# Patient Record
Sex: FEMALE | Race: WHITE | URBAN - METROPOLITAN AREA
[De-identification: names, ages, dates, MRNs, and addresses within clinical notes are randomized per-mention and may not be internally consistent; named-entity substitution may affect disease eponyms.]

---

## 2018-09-12 ENCOUNTER — EMERGENCY (EMERGENCY)
Facility: HOSPITAL | Age: 58
LOS: 1 days | Discharge: HOME | End: 2018-09-12
Attending: EMERGENCY MEDICINE | Admitting: EMERGENCY MEDICINE
Payer: COMMERCIAL

## 2018-09-12 VITALS
DIASTOLIC BLOOD PRESSURE: 65 MMHG | SYSTOLIC BLOOD PRESSURE: 102 MMHG | RESPIRATION RATE: 16 BRPM | HEART RATE: 73 BPM | TEMPERATURE: 98 F | OXYGEN SATURATION: 98 %

## 2018-09-12 VITALS
SYSTOLIC BLOOD PRESSURE: 95 MMHG | DIASTOLIC BLOOD PRESSURE: 59 MMHG | WEIGHT: 119.93 LBS | HEART RATE: 82 BPM | TEMPERATURE: 99 F | RESPIRATION RATE: 16 BRPM | OXYGEN SATURATION: 96 %
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
## 2018-09-12 DIAGNOSIS — S00.03XA CONTUSION OF SCALP, INITIAL ENCOUNTER: ICD-10-CM

## 2018-09-12 DIAGNOSIS — Z79.899 OTHER LONG TERM (CURRENT) DRUG THERAPY: ICD-10-CM
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
## 2018-09-12 DIAGNOSIS — F17.200 NICOTINE DEPENDENCE, UNSPECIFIED, UNCOMPLICATED: ICD-10-CM
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
## 2018-09-12 DIAGNOSIS — S06.9X9A UNSPECIFIED INTRACRANIAL INJURY WITH LOSS OF CONSCIOUSNESS OF UNSPECIFIED DURATION, INITIAL ENCOUNTER: ICD-10-CM

## 2018-09-12 DIAGNOSIS — R55 SYNCOPE AND COLLAPSE: ICD-10-CM

## 2018-09-12 DIAGNOSIS — Z90.49 ACQUIRED ABSENCE OF OTHER SPECIFIED PARTS OF DIGESTIVE TRACT: Chronic | ICD-10-CM
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
## 2018-09-12 DIAGNOSIS — E87.6 HYPOKALEMIA: ICD-10-CM

## 2018-09-12 LAB
ALBUMIN SERPL ELPH-MCNC: 4.1 G/DL — SIGNIFICANT CHANGE UP (ref 3.3–5)
ALP SERPL-CCNC: 44 U/L — SIGNIFICANT CHANGE UP (ref 40–120)
ALT FLD-CCNC: 13 U/L — SIGNIFICANT CHANGE UP (ref 10–45)
ANION GAP SERPL CALC-SCNC: 14 MMOL/L — SIGNIFICANT CHANGE UP (ref 5–17)
AST SERPL-CCNC: 22 U/L — SIGNIFICANT CHANGE UP (ref 10–40)
BASOPHILS NFR BLD AUTO: 0.2 % — SIGNIFICANT CHANGE UP (ref 0–2)
BILIRUB SERPL-MCNC: 0.4 MG/DL — SIGNIFICANT CHANGE UP (ref 0.2–1.2)
BUN SERPL-MCNC: 9 MG/DL — SIGNIFICANT CHANGE UP (ref 7–23)
CALCIUM SERPL-MCNC: 9.4 MG/DL — SIGNIFICANT CHANGE UP (ref 8.4–10.5)
CHLORIDE SERPL-SCNC: 92 MMOL/L — LOW (ref 96–108)
CK MB CFR SERPL CALC: 1.5 NG/ML — SIGNIFICANT CHANGE UP (ref 0–6.7)
CK SERPL-CCNC: 79 U/L — SIGNIFICANT CHANGE UP (ref 25–170)
CO2 SERPL-SCNC: 29 MMOL/L — SIGNIFICANT CHANGE UP (ref 22–31)
CREAT SERPL-MCNC: 0.63 MG/DL — SIGNIFICANT CHANGE UP (ref 0.5–1.3)
EOSINOPHIL NFR BLD AUTO: 1.2 % — SIGNIFICANT CHANGE UP (ref 0–6)
GLUCOSE SERPL-MCNC: 115 MG/DL — HIGH (ref 70–99)
HCT VFR BLD CALC: 38.5 % — SIGNIFICANT CHANGE UP (ref 34.5–45)
HGB BLD-MCNC: 13.3 G/DL — SIGNIFICANT CHANGE UP (ref 11.5–15.5)
LYMPHOCYTES # BLD AUTO: 14.1 % — SIGNIFICANT CHANGE UP (ref 13–44)
MCHC RBC-ENTMCNC: 29.8 PG — SIGNIFICANT CHANGE UP (ref 27–34)
MCHC RBC-ENTMCNC: 34.5 G/DL — SIGNIFICANT CHANGE UP (ref 32–36)
MCV RBC AUTO: 86.3 FL — SIGNIFICANT CHANGE UP (ref 80–100)
MONOCYTES NFR BLD AUTO: 7.1 % — SIGNIFICANT CHANGE UP (ref 2–14)
NEUTROPHILS NFR BLD AUTO: 77.4 % — HIGH (ref 43–77)
PLATELET # BLD AUTO: 259 K/UL — SIGNIFICANT CHANGE UP (ref 150–400)
POTASSIUM SERPL-MCNC: 2.6 MMOL/L — CRITICAL LOW (ref 3.5–5.3)
POTASSIUM SERPL-SCNC: 2.6 MMOL/L — CRITICAL LOW (ref 3.5–5.3)
PROT SERPL-MCNC: 6.7 G/DL — SIGNIFICANT CHANGE UP (ref 6–8.3)
RBC # BLD: 4.46 M/UL — SIGNIFICANT CHANGE UP (ref 3.8–5.2)
RBC # FLD: 13 % — SIGNIFICANT CHANGE UP (ref 10.3–16.9)
SODIUM SERPL-SCNC: 135 MMOL/L — SIGNIFICANT CHANGE UP (ref 135–145)
TROPONIN T SERPL-MCNC: <0.01 NG/ML — SIGNIFICANT CHANGE UP (ref 0–0.01)
WBC # BLD: 9.7 K/UL — SIGNIFICANT CHANGE UP (ref 3.8–10.5)
WBC # FLD AUTO: 9.7 K/UL — SIGNIFICANT CHANGE UP (ref 3.8–10.5)

## 2018-09-12 PROCEDURE — 70450 CT HEAD/BRAIN W/O DYE: CPT

## 2018-09-12 PROCEDURE — 93010 ELECTROCARDIOGRAM REPORT: CPT | Mod: 59

## 2018-09-12 PROCEDURE — 93005 ELECTROCARDIOGRAM TRACING: CPT

## 2018-09-12 PROCEDURE — 82550 ASSAY OF CK (CPK): CPT

## 2018-09-12 PROCEDURE — 70450 CT HEAD/BRAIN W/O DYE: CPT | Mod: 26

## 2018-09-12 PROCEDURE — 84484 ASSAY OF TROPONIN QUANT: CPT

## 2018-09-12 PROCEDURE — 80053 COMPREHEN METABOLIC PANEL: CPT

## 2018-09-12 PROCEDURE — 82553 CREATINE MB FRACTION: CPT

## 2018-09-12 PROCEDURE — 99284 EMERGENCY DEPT VISIT MOD MDM: CPT | Mod: 25

## 2018-09-12 PROCEDURE — 85025 COMPLETE CBC W/AUTO DIFF WBC: CPT

## 2018-09-12 PROCEDURE — 90792 PSYCH DIAG EVAL W/MED SRVCS: CPT

## 2018-09-12 RX ORDER — ACETAMINOPHEN 500 MG
650 TABLET ORAL ONCE
Qty: 0 | Refills: 0 | Status: COMPLETED | OUTPATIENT
Start: 2018-09-12 | End: 2018-09-12

## 2018-09-12 RX ORDER — POTASSIUM CHLORIDE 20 MEQ
10 PACKET (EA) ORAL ONCE
Qty: 0 | Refills: 0 | Status: COMPLETED | OUTPATIENT
Start: 2018-09-12 | End: 2018-09-12

## 2018-09-12 RX ORDER — CLONAZEPAM 1 MG
0 TABLET ORAL
Qty: 0 | Refills: 0 | COMMUNITY

## 2018-09-12 RX ORDER — SODIUM CHLORIDE 9 MG/ML
1000 INJECTION INTRAMUSCULAR; INTRAVENOUS; SUBCUTANEOUS ONCE
Qty: 0 | Refills: 0 | Status: COMPLETED | OUTPATIENT
Start: 2018-09-12 | End: 2018-09-12

## 2018-09-12 RX ORDER — POTASSIUM CHLORIDE 20 MEQ
10 PACKET (EA) ORAL ONCE
Qty: 0 | Refills: 0 | Status: DISCONTINUED | OUTPATIENT
Start: 2018-09-12 | End: 2018-09-12

## 2018-09-12 RX ORDER — POTASSIUM CHLORIDE 20 MEQ
40 PACKET (EA) ORAL ONCE
Qty: 0 | Refills: 0 | Status: COMPLETED | OUTPATIENT
Start: 2018-09-12 | End: 2018-09-12

## 2018-09-12 RX ADMIN — SODIUM CHLORIDE 2000 MILLILITER(S): 9 INJECTION INTRAMUSCULAR; INTRAVENOUS; SUBCUTANEOUS at 09:44

## 2018-09-12 RX ADMIN — Medication 40 MILLIEQUIVALENT(S): at 10:29

## 2018-09-12 RX ADMIN — Medication 100 MILLIEQUIVALENT(S): at 10:29

## 2018-09-12 RX ADMIN — SODIUM CHLORIDE 2000 MILLILITER(S): 9 INJECTION INTRAMUSCULAR; INTRAVENOUS; SUBCUTANEOUS at 09:46

## 2018-09-12 RX ADMIN — Medication 650 MILLIGRAM(S): at 09:43

## 2018-09-12 RX ADMIN — Medication 100 MILLIEQUIVALENT(S): at 11:49

## 2018-09-12 NOTE — ED PROVIDER NOTE - OBJECTIVE STATEMENT
57 yo female with hx of anxiety (takes Klonopin prn) p/w 2 episodes of syncope this morning. Pt states she was very stressed yesterday and could not sleep and took Z-quil 57 yo female with hx of anxiety (takes Klonopin prn), insomnia (takes prescribed Thorazine - 75 mg prn insomnia) p/w 2 episodes of syncope this morning. Pt states she was very stressed yesterday and could not sleep and took ZZZ-quil around 11 pm but was still unable to sleep and then took prescribed Thorazine (50 mg only) around 2 am. Pt then had to wake up this morning "for a meeting" and felt dizzy and lightheaded. According to daughter she heard a noise and found pt with decreased LOC in the hallway. Pt asked her for a banana and got up but then had another syncopal episode and fell to the floor. No seizure activity/ tongue-biting or incontinence. Pt cannot give a clear history about what transpired but states she has some pain to right side of her head from her falls. Denies CP, SOB, abd pain, fevers, chills, N,V,D, recent illness, focal neuro sxs. States she does not usually take Thorazine for sleep and she has had 2-3 episodes of syncope in the past associated with Thorazine use. Denies SI/HI/ hallucinations/ alcohol or illicit drug use. 57 yo female with hx of anxiety (takes Klonopin prn), insomnia (takes prescribed Thorazine - 75 mg prn insomnia) p/w 2 episodes of syncope this morning. Pt states she was very stressed yesterday and could not sleep and took ZZZ-quil around 11 pm but was still unable to sleep and then took prescribed Thorazine (50 mg only) around 2 am. Pt then had to wake up this morning "for a meeting" and felt dizzy and lightheaded. According to daughter she heard a noise and found pt with decreased LOC in the hallway. Pt asked her for a banana and got up but then had another syncopal episode and fell to the floor. No seizure activity/ tongue-biting or incontinence. Pt cannot give a clear history about what transpired but states she has some pain to right side of her head from her falls. Denies CP, SOB, abd pain, fevers, chills, N,V,D, recent illness, focal neuro sxs. States she does not usually take Thorazine for sleep and she has had 2-3 episodes of syncope in the past associated with Thorazine use. has had cardiac w/up including echo for her past episodes of syncope and reports that the w/up has been negative. Denies SI/HI/ hallucinations/ alcohol or illicit drug use. No neck pain or extremity pain.

## 2018-09-12 NOTE — ED BEHAVIORAL HEALTH ASSESSMENT NOTE - SUMMARY
57 yo female with hx of anxiiety (takes Klonopin prn), insomnia (takes prescribed Thorazine - 75 mg prn insomnia) p/w 2 episodes of syncope this morning. Pt states she was very stressed yesterday and could not sleep and took ZZZ-quil around 11 pm but was still unable to sleep and then took prescribed Thorazine (50 mg only) around 2 am. Pt then had to wake up this morning "for a meeting" and felt dizzy and lightheaded. A 57 yo female with hx of anxiety (takes Klonopin prn), insomnia (takes prescribed Thorazine - 75 mg prn insomnia), alcohol dependence, in full sustained remission, p/w 2 episodes of syncope this morning s/p taking 4 sedating medications last night, for insomnia exacerbated by anxiety.    Pt's presenting ss/sx likely rx-induced. In addition to sedating qualities of all 4 medications, thorazine a/w significant orthostatic hypotension.    Pt counselled regarding risks of taking mutiple hypnotic medications at the same time, including the syncopal episodes which led to ED visit today. She expressed understanding of these risks.    Daughters, understandably concerned regarding pt's 57 yo female with hx of anxiety (takes Klonopin prn), insomnia (takes prescribed Thorazine - 75 mg prn insomnia), alcohol dependence, in full sustained remission, p/w 2 episodes of syncope this morning s/p taking 4 sedating medications last night, for insomnia exacerbated by anxiety.    Pt's presenting ss/sx likely rx-induced. In addition to sedating qualities of all 4 medications, thorazine a/w significant orthostatic hypotension.    Pt counselled regarding risks of taking multiple hypnotic medications at the same time, including the syncopal episodes which led to ED visit today. She expressed understanding of these risks.    Daughters, understandably concerned regarding pt's possible abuse/overuse of prescription benzos and other sedating medications, provided with support, psychoeducation and recommendations regarding situation. They expressed understanding regarding possible role of family meeting with outpt psychiatrist and/or other interventions to assist pt in addressing her use of multiple sedating medications. They also expressed ability to investigate possible substance abuse programs for pt, should she agree to pursue this option.

## 2018-09-12 NOTE — ED PROVIDER NOTE - CARE PLAN
Principal Discharge DX:	Hypokalemia  Secondary Diagnosis:	Syncope  Secondary Diagnosis:	Closed head injury

## 2018-09-12 NOTE — ED ADULT TRIAGE NOTE - CHIEF COMPLAINT QUOTE
patient BIBA from home. patient took a sleeping pill last night that she does not normally take. this morning woke up feeling groggy and dizziness, fell x 2 hit head. no LOC no blood thinners, denies dizziness, blurry vision, chest pain. hematoma to left side of forehead, no bleeding or open wound

## 2018-09-12 NOTE — ED BEHAVIORAL HEALTH ASSESSMENT NOTE - DESCRIPTION (FIRST USE, LAST USE, QUANTITY, FREQUENCY, DURATION)
Smokes 2 cigarettes daily. Hx of EtOH Dependence. States she has been clean and sober since 2005. Reports she is rx'ed with Clonazepam 1 mg po prn and Alprazolam 1 mg po prn by one of her 2 outpt psychiatrists, Dr. Mario Pablo.

## 2018-09-12 NOTE — ED BEHAVIORAL HEALTH ASSESSMENT NOTE - SAFETY PLAN DETAILS
Discussed with daughters, including possible soft intervention and/or family meeting with outpt psychiatrist.

## 2018-09-12 NOTE — ED BEHAVIORAL HEALTH ASSESSMENT NOTE - DESCRIPTION
As per HPI Calm, cooperative. On stretcher. As above. Born and raised in [place], [place]. Graduate of [employer] in [place]. 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 .  2 daughters, ages 23 and 27. Lives with her younger daughter.

## 2018-09-12 NOTE — ED ADULT NURSE NOTE - OBJECTIVE STATEMENT
Patient is a 57yo female reporting 1 fall w/ head injury and 1 episode of syncope at 0800 this morning after taking 4 ZzzQuil at 11pm and 1 Thorazine 50mg at 2am for insomnia. Patient reports she rarely takes Thorazine, and has lost consciousness from it in the past. Denies any chest pain, dizziness, shortness of breath, abdominal pain, fevers. Neuro intact, no lacerations noted.

## 2018-09-12 NOTE — ED PROVIDER NOTE - PROGRESS NOTE DETAILS
Pt noted with low potassium. States she has a history of this in the past and does not take supplementation. Will replete potassium.

## 2018-09-12 NOTE — ED BEHAVIORAL HEALTH ASSESSMENT NOTE - NS ED BHA PLAN TR BH CONTACTED FT
Pt refused to let this writer contact Dr. Pablo or Dr. Espinal. However, she did give permission to discuss with MAGUI Lau.

## 2018-09-12 NOTE — ED ADULT NURSE NOTE - INTERVENTIONS DEFINITIONS
Instruct patient to call for assistance/Provide visual cue, wrist band, yellow gown, etc./Physically safe environment: no spills, clutter or unnecessary equipment/Non-slip footwear when patient is off stretcher/Stretcher in lowest position, wheels locked, appropriate side rails in place

## 2018-09-12 NOTE — ED PROVIDER NOTE - MEDICAL DECISION MAKING DETAILS
59 yo female with hx of anxiety (takes Klonopin prn), insomnia (takes prescribed Thorazine - 75 mg prn insomnia) p/w 2 episodes of syncope this morning. Pt states she was very stressed yesterday and could not sleep and took ZZZ-quil around 11 pm but was still unable to sleep and then took prescribed Thorazine (50 mg only) around 2 am. Pt then had to wake up this morning "for a meeting" and felt dizzy and lightheaded. According to daughter she heard a noise and found pt with decreased LOC in the hallway. Pt asked her for a banana and got up but then had another syncopal episode and fell to the floor. No seizure activity/ tongue-biting or incontinence. Pt cannot give a clear history about what transpired but states she has some pain to right side of her head from her falls. Denies CP, SOB, abd pain, fevers, chills, N,V,D, recent illness, focal neuro sxs. States she does not usually take Thorazine for sleep and she has had 2-3 episodes of syncope in the past associated with Thorazine use. has had cardiac w/up including echo for her past episodes of syncope and reports that the w/up has been negative. Denies SI/HI/ hallucinations/ alcohol or illicit drug use.

## 2018-09-12 NOTE — ED BEHAVIORAL HEALTH ASSESSMENT NOTE - REFERRAL / APPOINTMENT DETAILS
N/A. Discussed pt's possible misuse of prescription and non-prescription medications with pt and with her daughters. Although daughters are eager for pt to go into substance abuse rehab, this is neither something done directly from an ED, nor done without pt's cooperation and intention.

## 2018-09-12 NOTE — ED BEHAVIORAL HEALTH ASSESSMENT NOTE - SUICIDE PROTECTIVE FACTORS
Future oriented/Engaged in work or school/Identifies reasons for living/Responsibility to family and others/Supportive social network or family

## 2018-09-12 NOTE — ED BEHAVIORAL HEALTH ASSESSMENT NOTE - HPI (INCLUDE ILLNESS QUALITY, SEVERITY, DURATION, TIMING, CONTEXT, MODIFYING FACTORS, ASSOCIATED SIGNS AND SYMPTOMS)
57 yo female with hx of an"xiety (takes Klonopin prn), insomnia (takes prescribed Thorazine - 75 mg prn insomnia) p/w 2 episodes of syncope this morning. Pt states she was very stressed yesterday and could not sleep and took ZZZ-quil around 11 pm but was still unable to sleep and then took prescribed Thorazine (50 mg only) around 2 am. Pt then had to wake up this morning "for a meeting" and felt dizzy and lightheaded. According to daughter she heard a noise and found pt with decreased LOC in the hallway. Pt asked her for a banana and got up but then had another syncopal episode and fell to the floor. No seizure activity/ tongue-biting or incontinence. Pt cannot give a clear history about what transpired but states she has some pain to right side of her head from her falls. Denies CP, SOB, abd pain, fevers, chills, N,V,D, recent illness, focal neuro sxs. States she does not usually take Thorazine for sleep and she has had 2-3 episodes of syncope in the past associated with Thorazine use. has had cardiac w/up including echo for her past episodes of syncope and reports that the w/up has been negative. Denies SI/HI/ hallucinations/ alcohol or illicit drug use. No neck pain or extremity pain." From ED Provider Note: "59 yo female with hx of anxiiety (takes Klonopin prn), insomnia (takes prescribed Thorazine - 75 mg prn insomnia) p/w 2 episodes of syncope this morning. Pt states she was very stressed yesterday and could not sleep and took ZZZ-quil around 11 pm but was still unable to sleep and then took prescribed Thorazine (50 mg only) around 2 am. Pt then had to wake up this morning "for a meeting" and felt dizzy and lightheaded. According to daughter she heard a noise and found pt with decreased LOC in the hallway. Pt asked her for a banana and got up but then had another syncopal episode and fell to the floor. No seizure activity/ tongue-biting or incontinence. Pt cannot give a clear history about what transpired but states she has some pain to right side of her head from her falls. Denies CP, SOB, abd pain, fevers, chills, N,V,D, recent illness, focal neuro sxs. States she does not usually take Thorazine for sleep and she has had 2-3 episodes of syncope in the past associated with Thorazine use. has had cardiac w/up including echo for her past episodes of syncope and reports that the w/up has been negative. Denies SI/HI/ hallucinations/ alcohol or illicit drug use. No neck pain or extremity pain."    Pt fully evaluated, determined to be stable for discharge home.    Pt's daughters express concerns regarding pt's possible prescription benzo use/abuse, as well as strong desire for pt to go to substance abuse rehab, prompting psychiatry consult. From ED Provider Note: "59 yo female with hx of anxiiety (takes Klonopin prn), insomnia (takes prescribed Thorazine - 75 mg prn insomnia) p/w 2 episodes of syncope this morning. Pt states she was very stressed yesterday and could not sleep and took ZZZ-quil around 11 pm but was still unable to sleep and then took prescribed Thorazine (50 mg only) around 2 am. Pt then had to wake up this morning "for a meeting" and felt dizzy and lightheaded. According to daughter she heard a noise and found pt with decreased LOC in the hallway. Pt asked her for a banana and got up but then had another syncopal episode and fell to the floor. No seizure activity/ tongue-biting or incontinence. Pt cannot give a clear history about what transpired but states she has some pain to right side of her head from her falls. Denies CP, SOB, abd pain, fevers, chills, N,V,D, recent illness, focal neuro sxs. States she does not usually take Thorazine for sleep and she has had 2-3 episodes of syncope in the past associated with Thorazine use. has had cardiac w/up including echo for her past episodes of syncope and reports that the w/up has been negative. Denies SI/HI/ hallucinations/ alcohol or illicit drug use. No neck pain or extremity pain."    Pt fully evaluated, determined to be stable for discharge home.    Pt's daughters express concerns regarding pt's possible prescription benzo use/abuse, as well as strong desire for pt to go to substance abuse rehab, prompting psychiatry consult.    Of note: Pt also with hx of EtOH Use Disorder, in full sustained remission since 2005, s/p treatment including rx with Antabuse.    On interview, pt fully cooperative with discussion. Reports that she was very nervous regarding a business meeting scheduled for today, and thus was unable to fall asleep. She took 1 mg of clonazepam, followed by 1 mg po alprazolam. When she was not able to fall asleep, she took Z-Quill. After another hour, she took a 4th medication prescribed by one of the 2 outpt psychiatrists she is seeing, Dr. Mario Pablo. Pt can't recall name of medication, currently denies that the medication is thorazine, though her daughters insist that the pt took 75 mg of thorazine.    Pt currently does biweekly phone sessions with one of her psychiatrists, Dr. Mario Espinal, and also sees Dr. Mario Pablo for psychopharmacology sessions q monthly.    Pt denies depressed mood or other ss/sx of depression, also denies any hx of/current suicidality.     Pt allowed this writer to contact her PMD, Dr. Rubin Lau, though did not give me permission to speak with either of her psychiatrists. (Of note: Dr. Pablo operates out of Dr. Lau's office.) Dr. Lau stated that he has not had any heightened concerns regarding the pt. He is aware that pt is seeing Dr. Pablo, also aware that Dr. Pablo has prescribed thorazine for pt's complaint of insomnia. From ED Provider Note: "57 yo female with hx of anxiiety (takes Klonopin prn), insomnia (takes prescribed Thorazine - 75 mg prn insomnia) p/w 2 episodes of syncope this morning. Pt states she was very stressed yesterday and could not sleep and took ZZZ-quil around 11 pm but was still unable to sleep and then took prescribed Thorazine (50 mg only) around 2 am. Pt then had to wake up this morning "for a meeting" and felt dizzy and lightheaded. According to daughter she heard a noise and found pt with decreased LOC in the hallway. Pt asked her for a banana and got up but then had another syncopal episode and fell to the floor. No seizure activity/ tongue-biting or incontinence. Pt cannot give a clear history about what transpired but states she has some pain to right side of her head from her falls. Denies CP, SOB, abd pain, fevers, chills, N,V,D, recent illness, focal neuro sxs. States she does not usually take Thorazine for sleep and she has had 2-3 episodes of syncope in the past associated with Thorazine use. has had cardiac w/up including echo for her past episodes of syncope and reports that the w/up has been negative. Denies SI/HI/ hallucinations/ alcohol or illicit drug use. No neck pain or extremity pain."    Pt fully evaluated, determined to be stable for discharge home.    Pt's daughters express concerns regarding pt's possible prescription benzo use/abuse, as well as strong desire for pt to go to substance abuse rehab, prompting psychiatry consult.    Of note: Pt also with hx of EtOH Use Disorder, in full sustained remission since 2005, s/p treatment including rx with Antabuse.    On interview, pt fully cooperative with discussion. Reports that she was very nervous regarding a business meeting scheduled for today, and thus was unable to fall asleep. She took 1 mg of clonazepam, followed by 1 mg po alprazolam. When she was not able to fall asleep, she took Z-Quill. After another hour, she took a 4th medication prescribed by one of the 2 outpt psychiatrists she is seeing, Dr. Mario Pablo. Pt can't recall name of medication, currently denies that the medication is thorazine, though her daughters insist that the pt took 50 mg of thorazine.    Pt currently does biweekly phone sessions with one of her psychiatrists, Dr. Mario Espinal, and also sees Dr. Mario Pablo for psychopharmacology sessions q monthly.    Pt denies depressed mood or other ss/sx of depression, also denies any hx of/current suicidality.     Pt allowed this writer to contact her PMD, Dr. Rubin Lau, though did not give me permission to speak with either of her psychiatrists. (Of note: Dr. Pablo operates out of Dr. Lau's office.) Dr. Lau stated that he has not had any heightened concerns regarding the pt. He is aware that pt is seeing Dr. Pablo, also aware that Dr. Pablo has prescribed thorazine for pt's complaint of insomnia.

## 2018-09-12 NOTE — ED BEHAVIORAL HEALTH ASSESSMENT NOTE - RISK ASSESSMENT
Pt denies hx of suicidal ideation/intent/plan/attempts. Denies current suicidality or homicidality. She is using multiple sedation medications (benzos, thorazine, OTC hypnotic) and is thus at inadvertent risk or self-harm due to oversedation.

## 2018-09-12 NOTE — ED BEHAVIORAL HEALTH ASSESSMENT NOTE - OTHER PAST PSYCHIATRIC HISTORY (INCLUDE DETAILS REGARDING ONSET, COURSE OF ILLNESS, INPATIENT/OUTPATIENT TREATMENT)
As above.  No hx of inpt psychiatric admits.  No hx of suicidality or homicidality.  No hx of illicit drug abuse.    Reports she's been in multiple outpt treatments for medication and psychotherapy since age 25.    Currently sees 2 outpt psychiatrists:   Dr. Mario Espinal ([address]) x ~ 1 year. Speaks to him ~[id number]. (He is currently recovering from broken ankle, so no recent office visits.)  Dr. Mario Pablo ([address]--in PMD Sid's office) x ~6 months. Sees him ~q monthly. Reports Dr. Pablo prescribes both Klonopin and Xanax, as well as Thorazine prn insomnia. Daughters brought in pill bottle for Thorazine 50 mg po, stating pt takes 1 1/2 pills prn. Per Dr. Lau, the usual dose is 12/5 mg.  N.B. Dr. Pablo is licensed in [place], thus his prescriptions are not available on [facility].

## 2018-09-12 NOTE — ED BEHAVIORAL HEALTH ASSESSMENT NOTE - DIFFERENTIAL
r/o Prescription Benzodiazepine Abuse  Hx of EtOH Dependence, in full sustained remission r/o Prescription Benzodiazepine Abuse        Hx of EtOH Dependence, in full sustained remission

## 2019-06-26 NOTE — ED BEHAVIORAL HEALTH ASSESSMENT NOTE - NS ED BHA MED ROS GASTROINTESTINAL
RADIOGRAPH LUMBAR SPINE 3 VIEWS:



DATE:

6/26/2019



HISTORY:

77-year-old female with low back pain, thoracolumbar spondylosis, and thoracolumbar radiculopathy.



FINDINGS:

There is diffuse severe osteopenia. There are old fracture deformities of the bilateral inferior pelv
ic rami. There are 3 metallic left SI joint fusion screws. There is metallic hardware for left

total hip replacement arthroplasty. There is a transitional level at the thoracolumbar junction. For 
the purposes of this report, the level with tiny, hypoplastic ribs will be designated as T12 rather

than L1. There are bilateral pedicle screws at every lumbar level from L1 to L4. There are additional
 left-sided pedicle screws at L5 and S1, with vertical interlocking rods. There is vertebroplasty

polymethylmethacrylate cement at every visualized level in the lower thoracic spine with chronic comp
ression fractures. No major spondylolisthesis is identified. There is a levoscoliosis of the lower

thoracic spine, and a mild compensatory dextroscoliosis of the lumbar spine. Multilevel high-grade de
generative disc disease. No major interval change in the lumbar spine compared to 6/11/2015. There

have been additional vertebral plasties in the thoracic spine since the previous study.



IMPRESSION:

1) compression fractures treated with vertebroplasty at the last 5 thoracic vertebrae.

2.) Diffuse, severe osteoporosis.

3) old fractures of pelvis bilaterally.

4) pedicle screws throughout the lumbar spine bilaterally.

5) lumbar spondylosis with multilevel degenerative disc disease.

6) status post arthrodesis of left sacroiliac joint.

7) status post total left hip replacement arthroplasty.

8) no interval change identified compared to 6/11/2015 regarding the lumbar spine proper.



Reported By: Jhonny Chávez 

Electronically Signed:  6/26/2019 5:01 PM No complaints

## 2022-01-04 NOTE — ED BEHAVIORAL HEALTH ASSESSMENT NOTE - PRIVATE RESIDENCE
"Called by nurse this evening regarding patients pain requesting additional narcotic pin medications.  Tough situation, she clearly has a chronic component to this pain and both acute and chronic musculoskeletal back are not treated primarily with narcotic pain medications.  Her Ct scan was rather unimpressive for an acute etiology for this severity of pain.  My hope had been to complete an MRI and initiate steroids today however this could not be completed.  I'm not sure how the patient feels as though she isnt being taken seriously as written in the nursing note.  We clearly discussed the acute and chronic nature of her symptoms and the need for additional imaging before committing to a treatment plan.  Per OT notes \"Pt appears to be back at baseline with no deficits and no OT needs at this time. OT will d/c\".  Given this is happening outside of my ability to reassess the patient I will place an order for IV morphine for breakthrough pain this evening to allow for reassessment in the AM.  If additional medications are requested it is recommended the patient be evaluated by the covering APRN.    Electronically signed by David Brumfield MD, 01/03/22, 8:43 PM EST.    " [address]

## 2022-08-01 NOTE — ED BEHAVIORAL HEALTH ASSESSMENT NOTE - FAMILY HISTORY OF MEDICAL ILLNESS
REVIEW OF SYSTEMS:  CONSTITUTIONAL: No weakness. No fevers. No chills. No rigors.  EYES: No blurry or double vision. No eye pain.  ENT: No hearing difficulty. No vertigo. No dysphagia. No sore throat. No Sinusitis/rhinorrhea.   NECK: No pain. No stiffness/rigidity.  CARDIAC: No chest pain. No palpitations. No lightheadedness. No syncope.  RESPIRATORY: No cough. No SOB.   GASTROINTESTINAL: No abdominal pain. No nausea. No vomiting. No hematemesis. No diarrhea. No constipation.   GENITOURINARY: No dysuria. No frequency.   NEUROLOGICAL: No numbness/tingling. +left mild weakness. No urinary or fecal incontinence. No headache. +unsteady gait.  BACK: No back pain. No flank pain.  EXTREMITIES: No lower extremity edema. Full ROM. No joint pain.  SKIN: No rashes. No itching. No other lesions.  PSYCHIATRIC: No depression. No anxiety. No SI/HI.  ALLERGIC: No lip swelling. No hives.  All other review of systems is negative unless indicated above.  Unless indicated above, unable to assess ROS 2/2
None known
